# Patient Record
Sex: FEMALE | Race: OTHER | NOT HISPANIC OR LATINO | URBAN - METROPOLITAN AREA
[De-identification: names, ages, dates, MRNs, and addresses within clinical notes are randomized per-mention and may not be internally consistent; named-entity substitution may affect disease eponyms.]

---

## 2019-12-18 VITALS
HEIGHT: 65 IN | OXYGEN SATURATION: 100 % | HEART RATE: 53 BPM | SYSTOLIC BLOOD PRESSURE: 126 MMHG | WEIGHT: 184.09 LBS | TEMPERATURE: 98 F | DIASTOLIC BLOOD PRESSURE: 71 MMHG | RESPIRATION RATE: 17 BRPM

## 2019-12-18 RX ORDER — CHLORHEXIDINE GLUCONATE 213 G/1000ML
1 SOLUTION TOPICAL ONCE
Refills: 0 | Status: DISCONTINUED | OUTPATIENT
Start: 2020-01-24 | End: 2020-01-24

## 2019-12-18 NOTE — H&P ADULT - RS GEN PE MLT RESP DETAILS PC
no rales/clear to auscultation bilaterally/respirations non-labored/good air movement/normal/airway patent/breath sounds equal

## 2019-12-18 NOTE — H&P ADULT - HISTORY OF PRESENT ILLNESS
**Skeleton  **Verify meds    58 yo obese F with PMHx of HTN, HLD, DM, vertigo    who presented to Cardiologist Dr. Gates with c/o non-radiating LSCP described as pressure and of _/10 intensity with associated CHUNG upon ambulation of ___ city blocks, resolving with rest, over past ____. Intermittent B/L LE edema.     Denies SOB at rest, dizziness, diaphoresis, palpitations, LE edema, fatigue, orthopnea, PND, syncope, N/V, abdominal pain.     NST 11/29/19: LVEF 60-65%, moderate degree medium extent mildly abnormal perfusion c/w mild (reversible) ischemia located in the mid inferoseptal wall and basal inferoseptal wall (RCA region) of LV. ECHO 10/2019 per MD note: EF 57% with Grade I diastolic dysfunction.     In setting of pt's risk factors, CCS Class Anginal ___ Sx, abnormal NST, pt referred for cardiac cath with possible intervention to r/o underlying CAD. **Verify meds    60 yo obese F with PMHx of HTN, HLD, DM, vertigo who presented to Cardiologist Dr. Gates with c/o central, 3/10, non-radiating, intermittent CP to "several blocks" described as dullness that is increased with activity and decreased with rest. Patient states that she has been experiencing this pain for about 3 months at which time she was referred for cardiac catheterization. However due to scheduling conflicts she had to postpone. Her symptoms have since increased. She states her CP is associated with occasional palpitations, however pt denies SOB, dizziness, diaphoresis LE edema, orthopnea/PND, fatigue, N/V, syncope, fever/chills, cough, BRBPR/hematuria/hematemesis. NST 11/29/19: LVEF 60-65%, moderate degree medium extent mildly abnormal perfusion c/w mild (reversible) ischemia located in the mid inferoseptal wall and basal inferoseptal wall (RCA region) of LV. ECHO 10/2019 per MD note: EF 57% with Grade I diastolic dysfunction.     In setting of pt's risk factors, CCS Class Anginal II Sx, abnormal NST, pt referred for cardiac cath with possible intervention to r/o underlying CAD. 60 yo obese F with PMHx of HTN, HLD, DM, vertigo who presented to Cardiologist Dr. Gates with c/o central, 3/10, non-radiating, intermittent CP to "several blocks" described as dullness that is increased with activity and decreased with rest. Patient states that she has been experiencing this pain for about 3 months at which time she was referred for cardiac catheterization. However due to scheduling conflicts she had to postpone. Her symptoms have since increased. She states her CP is associated with occasional palpitations, however pt denies SOB, dizziness, diaphoresis LE edema, orthopnea/PND, fatigue, N/V, syncope, fever/chills, cough, BRBPR/hematuria/hematemesis. NST 11/29/19: LVEF 60-65%, moderate degree medium extent mildly abnormal perfusion c/w mild (reversible) ischemia located in the mid inferoseptal wall and basal inferoseptal wall (RCA region) of LV. ECHO 10/2019 per MD note: EF 57% with Grade I diastolic dysfunction.     In setting of pt's risk factors, CCS Class Anginal II Sx, abnormal NST, pt referred for cardiac cath with possible intervention to r/o underlying CAD.

## 2019-12-18 NOTE — H&P ADULT - NSHPLABSRESULTS_GEN_ALL_CORE
01-24    142  |  106  |  10  ----------------------------<  87  4.2   |  24  |  0.60    Ca    9.2      24 Jan 2020 15:57    TPro  7.6  /  Alb  3.9  /  TBili  0.3  /  DBili  x   /  AST  13  /  ALT  16  /  AlkPhos  89  01-24                          12.8   6.94  )-----------( 246      ( 24 Jan 2020 15:57 )             42.3 01-24    142  |  106  |  10  ----------------------------<  87  4.2   |  24  |  0.60    Ca    9.2      24 Jan 2020 15:57    TPro  7.6  /  Alb  3.9  /  TBili  0.3  /  DBili  x   /  AST  13  /  ALT  16  /  AlkPhos  89  01-24                          12.8   6.94  )-----------( 246      ( 24 Jan 2020 15:57 )             42.3    PT/INR - ( 24 Jan 2020 15:57 )   PT: 12.4 sec;   INR: 1.08          PTT - ( 24 Jan 2020 15:57 )  PTT:29.1 sec

## 2019-12-18 NOTE — H&P ADULT - NEUROLOGICAL DETAILS
cranial nerves intact/normal strength/alert and oriented x 3/responds to pain/no spontaneous movement

## 2019-12-18 NOTE — H&P ADULT - ASSESSMENT
60 yo obese F with PMHx of HTN, HLD, DM, vertigo who presented to Cardiologist Dr. Gates with c/o central, 3/10, non-radiating, intermittent CP to "several blocks" described as dullness that is increased with activity and decreased with rest. Pt also had an abnormal NST on 11/29/19. Pt now presents for cardiac catheterization with possible intervention due to her risk factors, ongoing anginal symptoms, and abnormal NST.     -ASA III  -Mallampati IV  -VSS  -Hgb/Hct = 12.8/42.3; Platelet count = 246; Pt denies any recent history of bleeding.   -Fluids: pt euvolemic on exam; pt started on NS 75cc/hr x4hrs.   -Load: pt does not take Aspirin or Plavix at home; Pt given Aspirin 324mg chewable x1 and Plavix 600mg PO x1. 60 yo obese F with PMHx of HTN, HLD, DM, vertigo who presented to Cardiologist Dr. Gates with c/o central, 3/10, non-radiating, intermittent CP to "several blocks" described as dullness that is increased with activity and decreased with rest. Pt also had an abnormal NST on 11/29/19. Pt now presents for cardiac catheterization with possible intervention due to her risk factors, ongoing anginal symptoms, and abnormal NST.     -ASA III  -Mallampati IV  -VSS  -Hgb/Hct = 12.8/42.3; Platelet count = 246; Pt denies any recent history of bleeding.   -Fluids: pt euvolemic on exam; pt started on NS 75cc/hr x4hrs.   -Load: pt does not take Aspirin or Plavix at home; Pt given Aspirin 324mg chewable x1 and Plavix 600mg PO x1.     Risks & benefits of procedure and alternative therapy have been explained to the patient including but not limited to: allergic reaction, bleeding w/possible need for blood transfusion, infection, renal and vascular compromise, limb damage, arrhythmia, stroke, vessel dissection/perforation, Myocardial infarction, emergent CABG. Informed consent obtained and in chart.

## 2020-01-24 ENCOUNTER — OUTPATIENT (OUTPATIENT)
Dept: OUTPATIENT SERVICES | Facility: HOSPITAL | Age: 60
LOS: 1 days | Discharge: NURSING, REHAB OR LONG-TERM CARE | End: 2020-01-24
Payer: COMMERCIAL

## 2020-01-24 LAB
ALBUMIN SERPL ELPH-MCNC: 3.9 G/DL — SIGNIFICANT CHANGE UP (ref 3.3–5)
ALP SERPL-CCNC: 89 U/L — SIGNIFICANT CHANGE UP (ref 40–120)
ALT FLD-CCNC: 16 U/L — SIGNIFICANT CHANGE UP (ref 10–45)
ANION GAP SERPL CALC-SCNC: 12 MMOL/L — SIGNIFICANT CHANGE UP (ref 5–17)
APTT BLD: 29.1 SEC — SIGNIFICANT CHANGE UP (ref 27.5–36.3)
AST SERPL-CCNC: 13 U/L — SIGNIFICANT CHANGE UP (ref 10–40)
BASOPHILS # BLD AUTO: 0.04 K/UL — SIGNIFICANT CHANGE UP (ref 0–0.2)
BASOPHILS NFR BLD AUTO: 0.6 % — SIGNIFICANT CHANGE UP (ref 0–2)
BILIRUB SERPL-MCNC: 0.3 MG/DL — SIGNIFICANT CHANGE UP (ref 0.2–1.2)
BUN SERPL-MCNC: 10 MG/DL — SIGNIFICANT CHANGE UP (ref 7–23)
CALCIUM SERPL-MCNC: 9.2 MG/DL — SIGNIFICANT CHANGE UP (ref 8.4–10.5)
CHLORIDE SERPL-SCNC: 106 MMOL/L — SIGNIFICANT CHANGE UP (ref 96–108)
CHOLEST SERPL-MCNC: 123 MG/DL — SIGNIFICANT CHANGE UP (ref 10–199)
CK MB CFR SERPL CALC: 2.3 NG/ML — SIGNIFICANT CHANGE UP (ref 0–6.7)
CK SERPL-CCNC: 106 U/L — SIGNIFICANT CHANGE UP (ref 25–170)
CO2 SERPL-SCNC: 24 MMOL/L — SIGNIFICANT CHANGE UP (ref 22–31)
CREAT SERPL-MCNC: 0.6 MG/DL — SIGNIFICANT CHANGE UP (ref 0.5–1.3)
CRP SERPL-MCNC: 0.42 MG/DL — HIGH (ref 0–0.4)
EOSINOPHIL # BLD AUTO: 0.34 K/UL — SIGNIFICANT CHANGE UP (ref 0–0.5)
EOSINOPHIL NFR BLD AUTO: 4.9 % — SIGNIFICANT CHANGE UP (ref 0–6)
GLUCOSE SERPL-MCNC: 87 MG/DL — SIGNIFICANT CHANGE UP (ref 70–99)
HBA1C BLD-MCNC: 6.2 % — HIGH (ref 4–5.6)
HCT VFR BLD CALC: 42.3 % — SIGNIFICANT CHANGE UP (ref 34.5–45)
HDLC SERPL-MCNC: 41 MG/DL — LOW
HGB BLD-MCNC: 12.8 G/DL — SIGNIFICANT CHANGE UP (ref 11.5–15.5)
IMM GRANULOCYTES NFR BLD AUTO: 0.3 % — SIGNIFICANT CHANGE UP (ref 0–1.5)
INR BLD: 1.08 — SIGNIFICANT CHANGE UP (ref 0.88–1.16)
LIPID PNL WITH DIRECT LDL SERPL: 64 MG/DL — SIGNIFICANT CHANGE UP
LYMPHOCYTES # BLD AUTO: 3.4 K/UL — HIGH (ref 1–3.3)
LYMPHOCYTES # BLD AUTO: 49 % — HIGH (ref 13–44)
MCHC RBC-ENTMCNC: 22.8 PG — LOW (ref 27–34)
MCHC RBC-ENTMCNC: 30.3 GM/DL — LOW (ref 32–36)
MCV RBC AUTO: 75.4 FL — LOW (ref 80–100)
MONOCYTES # BLD AUTO: 0.71 K/UL — SIGNIFICANT CHANGE UP (ref 0–0.9)
MONOCYTES NFR BLD AUTO: 10.2 % — SIGNIFICANT CHANGE UP (ref 2–14)
NEUTROPHILS # BLD AUTO: 2.43 K/UL — SIGNIFICANT CHANGE UP (ref 1.8–7.4)
NEUTROPHILS NFR BLD AUTO: 35 % — LOW (ref 43–77)
NRBC # BLD: 0 /100 WBCS — SIGNIFICANT CHANGE UP (ref 0–0)
PLATELET # BLD AUTO: 246 K/UL — SIGNIFICANT CHANGE UP (ref 150–400)
POTASSIUM SERPL-MCNC: 4.2 MMOL/L — SIGNIFICANT CHANGE UP (ref 3.5–5.3)
POTASSIUM SERPL-SCNC: 4.2 MMOL/L — SIGNIFICANT CHANGE UP (ref 3.5–5.3)
PROT SERPL-MCNC: 7.6 G/DL — SIGNIFICANT CHANGE UP (ref 6–8.3)
PROTHROM AB SERPL-ACNC: 12.4 SEC — SIGNIFICANT CHANGE UP (ref 10–12.9)
RBC # BLD: 5.61 M/UL — HIGH (ref 3.8–5.2)
RBC # FLD: 16.4 % — HIGH (ref 10.3–14.5)
SODIUM SERPL-SCNC: 142 MMOL/L — SIGNIFICANT CHANGE UP (ref 135–145)
TOTAL CHOLESTEROL/HDL RATIO MEASUREMENT: 3 RATIO — LOW (ref 3.3–7.1)
TRIGL SERPL-MCNC: 88 MG/DL — SIGNIFICANT CHANGE UP (ref 10–149)
WBC # BLD: 6.94 K/UL — SIGNIFICANT CHANGE UP (ref 3.8–10.5)
WBC # FLD AUTO: 6.94 K/UL — SIGNIFICANT CHANGE UP (ref 3.8–10.5)

## 2020-01-24 PROCEDURE — C1887: CPT

## 2020-01-24 PROCEDURE — 93005 ELECTROCARDIOGRAM TRACING: CPT

## 2020-01-24 PROCEDURE — 86140 C-REACTIVE PROTEIN: CPT

## 2020-01-24 PROCEDURE — 82962 GLUCOSE BLOOD TEST: CPT

## 2020-01-24 PROCEDURE — 83036 HEMOGLOBIN GLYCOSYLATED A1C: CPT

## 2020-01-24 PROCEDURE — 82550 ASSAY OF CK (CPK): CPT

## 2020-01-24 PROCEDURE — 93010 ELECTROCARDIOGRAM REPORT: CPT

## 2020-01-24 PROCEDURE — 85730 THROMBOPLASTIN TIME PARTIAL: CPT

## 2020-01-24 PROCEDURE — 85610 PROTHROMBIN TIME: CPT

## 2020-01-24 PROCEDURE — 93458 L HRT ARTERY/VENTRICLE ANGIO: CPT

## 2020-01-24 PROCEDURE — C1769: CPT

## 2020-01-24 PROCEDURE — 82553 CREATINE MB FRACTION: CPT

## 2020-01-24 PROCEDURE — 80061 LIPID PANEL: CPT

## 2020-01-24 PROCEDURE — 85025 COMPLETE CBC W/AUTO DIFF WBC: CPT

## 2020-01-24 PROCEDURE — 80053 COMPREHEN METABOLIC PANEL: CPT

## 2020-01-24 PROCEDURE — C1894: CPT

## 2020-01-24 RX ORDER — CLOPIDOGREL BISULFATE 75 MG/1
600 TABLET, FILM COATED ORAL ONCE
Refills: 0 | Status: COMPLETED | OUTPATIENT
Start: 2020-01-24 | End: 2020-01-24

## 2020-01-24 RX ORDER — ASPIRIN/CALCIUM CARB/MAGNESIUM 324 MG
324 TABLET ORAL ONCE
Refills: 0 | Status: COMPLETED | OUTPATIENT
Start: 2020-01-24 | End: 2020-01-24

## 2020-01-24 RX ORDER — OMEPRAZOLE 10 MG/1
1 CAPSULE, DELAYED RELEASE ORAL
Qty: 0 | Refills: 0 | DISCHARGE

## 2020-01-24 RX ORDER — SODIUM CHLORIDE 9 MG/ML
500 INJECTION INTRAMUSCULAR; INTRAVENOUS; SUBCUTANEOUS
Refills: 0 | Status: DISCONTINUED | OUTPATIENT
Start: 2020-01-24 | End: 2020-01-24

## 2020-01-24 RX ORDER — ATENOLOL 25 MG/1
1 TABLET ORAL
Qty: 0 | Refills: 0 | DISCHARGE

## 2020-01-24 RX ORDER — AMLODIPINE BESYLATE 2.5 MG/1
1 TABLET ORAL
Qty: 0 | Refills: 0 | DISCHARGE

## 2020-01-24 RX ORDER — MECLIZINE HCL 12.5 MG
1 TABLET ORAL
Qty: 0 | Refills: 0 | DISCHARGE

## 2020-01-24 RX ADMIN — CLOPIDOGREL BISULFATE 600 MILLIGRAM(S): 75 TABLET, FILM COATED ORAL at 16:34

## 2020-01-24 RX ADMIN — Medication 324 MILLIGRAM(S): at 16:35

## 2020-01-24 RX ADMIN — SODIUM CHLORIDE 75 MILLILITER(S): 9 INJECTION INTRAMUSCULAR; INTRAVENOUS; SUBCUTANEOUS at 16:34

## 2020-01-24 NOTE — PROGRESS NOTE ADULT - SUBJECTIVE AND OBJECTIVE BOX
CORONARY ANGIOGRAPHY  01/24/2020    Indication: CHUNG, abnormal stress test    Conclusion  Frailty Index: 3    1. Coronary Angiography  LM: large caliber, normal   LAD: large caliber vessel, normal, mid LAD mild luminal irregularities  LCx: large caliber vessel, normal  RCA: dominant, normal    2. Left Heart Cath  LVEF 50%, normal wall motion, LVEDP 9 mmHg  no AS, no MR     Recommendations  non obstructive CAD  normal LVEF  medical management  d/c home  follow up with Dr. Gates    Access: R CFA, manual pressure

## 2020-01-24 NOTE — PROGRESS NOTE ADULT - SUBJECTIVE AND OBJECTIVE BOX
[facility] Discharge Note    Patient without complaints. Ambulated and voided without difficulties    Afebrile, VSS    Ext:    Right Groin:    no   hematoma,     bruit, dressing; C/D/I  		           Pulses:    intact RAD/DP/PT to baseline     A/P:    60 yo obese F with PMHx of HTN, HLD, DM, vertigo who presented to Cardiologist Dr. Gates with c/o central, 3/10, non-radiating, intermittent CP to "several blocks" described as dullness that is increased with activity and decreased with rest. Patient states that she has been experiencing this pain for about 3 months at which time she was referred for cardiac catheterization. However due to scheduling conflicts she had to postpone. Her symptoms have since increased. She states her CP is associated with occasional palpitations, however pt denies SOB, dizziness, diaphoresis LE edema, orthopnea/PND, fatigue, N/V, syncope, fever/chills, cough, BRBPR/hematuria/hematemesis. NST 11/29/19: LVEF 60-65%, moderate degree medium extent mildly abnormal perfusion c/w mild (reversible) ischemia located in the mid inferoseptal wall and basal inferoseptal wall (RCA region) of LV. ECHO 10/2019 per MD note: EF 57% with Grade I diastolic dysfunction. In setting of pt's risk factors, CCS Class Anginal II Sx, abnormal NST, pt referred for cardiac cath with possible intervention to r/o underlying CAD. Patient is s/p cardiac cath revealing nonobstructive CAD.                   1.	Stable for discharge as per attending Dr. Gates after bed rest, pt voids, groin stable and 30 minutes of ambulation.  2.	Follow-up with PMD/Cardiologist Dr. Gates in 1-2 weeks  3.	Discharged forms signed and copies in chart